# Patient Record
Sex: FEMALE | Race: WHITE | Employment: UNEMPLOYED | URBAN - METROPOLITAN AREA
[De-identification: names, ages, dates, MRNs, and addresses within clinical notes are randomized per-mention and may not be internally consistent; named-entity substitution may affect disease eponyms.]

---

## 2019-02-04 DIAGNOSIS — Z00.00 WELL ADULT EXAM: ICD-10-CM

## 2019-02-04 DIAGNOSIS — Z11.3 SCREENING FOR STDS (SEXUALLY TRANSMITTED DISEASES): ICD-10-CM

## 2019-02-04 DIAGNOSIS — N92.6 IRREGULAR MENSTRUAL BLEEDING: ICD-10-CM

## 2019-02-04 LAB
A/G RATIO: 2.1 (ref 1.1–2.2)
ALBUMIN SERPL-MCNC: 5 G/DL (ref 3.4–5)
ALP BLD-CCNC: 81 U/L (ref 40–129)
ALT SERPL-CCNC: 22 U/L (ref 10–40)
ANION GAP SERPL CALCULATED.3IONS-SCNC: 13 MMOL/L (ref 3–16)
AST SERPL-CCNC: 16 U/L (ref 15–37)
BILIRUB SERPL-MCNC: <0.2 MG/DL (ref 0–1)
BUN BLDV-MCNC: 8 MG/DL (ref 7–20)
CALCIUM SERPL-MCNC: 9.7 MG/DL (ref 8.3–10.6)
CHLORIDE BLD-SCNC: 102 MMOL/L (ref 99–110)
CHOLESTEROL, TOTAL: 179 MG/DL (ref 0–199)
CO2: 26 MMOL/L (ref 21–32)
CREAT SERPL-MCNC: 0.6 MG/DL (ref 0.6–1.1)
GFR AFRICAN AMERICAN: >60
GFR NON-AFRICAN AMERICAN: >60
GLOBULIN: 2.4 G/DL
GLUCOSE BLD-MCNC: 91 MG/DL (ref 70–99)
HBV SURFACE AB TITR SER: <3.5 MIU/ML
HCT VFR BLD CALC: 43.6 % (ref 36–48)
HDLC SERPL-MCNC: 59 MG/DL (ref 40–60)
HEMOGLOBIN: 14.9 G/DL (ref 12–16)
HEPATITIS C ANTIBODY INTERPRETATION: NORMAL
LDL CHOLESTEROL CALCULATED: 106 MG/DL
MCH RBC QN AUTO: 33 PG (ref 26–34)
MCHC RBC AUTO-ENTMCNC: 34.3 G/DL (ref 31–36)
MCV RBC AUTO: 96.2 FL (ref 80–100)
PDW BLD-RTO: 12.3 % (ref 12.4–15.4)
PLATELET # BLD: 347 K/UL (ref 135–450)
PMV BLD AUTO: 7.3 FL (ref 5–10.5)
POTASSIUM SERPL-SCNC: 4.5 MMOL/L (ref 3.5–5.1)
RBC # BLD: 4.53 M/UL (ref 4–5.2)
SODIUM BLD-SCNC: 141 MMOL/L (ref 136–145)
TOTAL PROTEIN: 7.4 G/DL (ref 6.4–8.2)
TRIGL SERPL-MCNC: 71 MG/DL (ref 0–150)
TSH REFLEX: 2.72 UIU/ML (ref 0.27–4.2)
VLDLC SERPL CALC-MCNC: 14 MG/DL
WBC # BLD: 5.3 K/UL (ref 4–11)

## 2019-02-05 LAB
HIV AG/AB: NORMAL
HIV ANTIGEN: NORMAL
HIV-1 ANTIBODY: NORMAL
HIV-2 AB: NORMAL
TOTAL SYPHILLIS IGG/IGM: NORMAL

## 2019-02-06 LAB
DHEAS (DHEA SULFATE): 366 UG/DL (ref 65–380)
INSULIN REFERENCE RANGE:: NORMAL
INSULIN: 14.3 MU/L
TESTOSTERONE TOTAL: 49 NG/DL (ref 20–70)

## 2020-04-14 ENCOUNTER — TELEPHONE (OUTPATIENT)
Dept: ORTHOPEDIC SURGERY | Age: 25
End: 2020-04-14

## 2020-12-02 PROBLEM — Z30.41 SURVEILLANCE FOR BIRTH CONTROL, ORAL CONTRACEPTIVES: Status: ACTIVE | Noted: 2020-12-02

## 2021-07-07 DIAGNOSIS — Z00.00 WELL ADULT EXAM: ICD-10-CM

## 2021-07-07 DIAGNOSIS — E28.2 PCOS (POLYCYSTIC OVARIAN SYNDROME): ICD-10-CM

## 2021-07-08 DIAGNOSIS — L68.0 HIRSUTISM: ICD-10-CM

## 2021-07-08 LAB
A/G RATIO: 1.7 (ref 1.1–2.2)
ALBUMIN SERPL-MCNC: 4.5 G/DL (ref 3.4–5)
ALP BLD-CCNC: 61 U/L (ref 40–129)
ALT SERPL-CCNC: 16 U/L (ref 10–40)
ANION GAP SERPL CALCULATED.3IONS-SCNC: 12 MMOL/L (ref 3–16)
AST SERPL-CCNC: 18 U/L (ref 15–37)
BILIRUB SERPL-MCNC: 0.3 MG/DL (ref 0–1)
BUN BLDV-MCNC: 10 MG/DL (ref 7–20)
CALCIUM SERPL-MCNC: 9.4 MG/DL (ref 8.3–10.6)
CHLORIDE BLD-SCNC: 99 MMOL/L (ref 99–110)
CHOLESTEROL, TOTAL: 170 MG/DL (ref 0–199)
CO2: 24 MMOL/L (ref 21–32)
CORTISOL - AM: 30.2 UG/DL (ref 4.3–22.4)
CREAT SERPL-MCNC: 0.6 MG/DL (ref 0.6–1.1)
GFR AFRICAN AMERICAN: >60
GFR NON-AFRICAN AMERICAN: >60
GLOBULIN: 2.6 G/DL
GLUCOSE BLD-MCNC: 91 MG/DL (ref 70–99)
HCT VFR BLD CALC: 38.3 % (ref 36–48)
HDLC SERPL-MCNC: 60 MG/DL (ref 40–60)
HEMOGLOBIN: 13.5 G/DL (ref 12–16)
LDL CHOLESTEROL CALCULATED: 81 MG/DL
MCH RBC QN AUTO: 33.3 PG (ref 26–34)
MCHC RBC AUTO-ENTMCNC: 35.3 G/DL (ref 31–36)
MCV RBC AUTO: 94.1 FL (ref 80–100)
PDW BLD-RTO: 11.5 % (ref 12.4–15.4)
PLATELET # BLD: 298 K/UL (ref 135–450)
PMV BLD AUTO: 7.4 FL (ref 5–10.5)
POTASSIUM SERPL-SCNC: 4.6 MMOL/L (ref 3.5–5.1)
RBC # BLD: 4.07 M/UL (ref 4–5.2)
SODIUM BLD-SCNC: 135 MMOL/L (ref 136–145)
TOTAL PROTEIN: 7.1 G/DL (ref 6.4–8.2)
TRIGL SERPL-MCNC: 147 MG/DL (ref 0–150)
TSH REFLEX: 2.65 UIU/ML (ref 0.27–4.2)
VLDLC SERPL CALC-MCNC: 29 MG/DL
WBC # BLD: 7 K/UL (ref 4–11)

## 2021-07-09 LAB
ESTIMATED AVERAGE GLUCOSE: 88.2 MG/DL
HBA1C MFR BLD: 4.7 %
SEX HORMONE BINDING GLOBULIN: 128 NMOL/L (ref 30–135)
TESTOSTERONE FREE-NONMALE: 3.2 PG/ML (ref 0.8–7.4)
TESTOSTERONE TOTAL: 48 NG/DL (ref 20–70)

## 2021-07-12 LAB — DHEAS (DHEA SULFATE): 190 UG/DL (ref 65–380)

## 2023-03-14 NOTE — PROGRESS NOTES
Subjective:      Patient ID: Francia Ochoa is a 32 y.o. female is here for her annual wellness exam.     HPI    PAP: negative 8/29/22. Dr. Renetta Zapata. See Care Everywhere. Menses are regular. Vaccines: had initial 2 doses. Diet:  eats well most of the time  Exercise: 44178 steps a day. Sleeps well. Labs: normal 2021. ADD/ADHD:  Current treatment: Adderall- 10 mg and Adderall XR- 25 mg, which has been effective. Residual symptoms: none. Medication side effects: None. Patient denies anorexia, palpitations, insomnia, irritability, and anxiety. PDMP reviewed and no concerns noted. Last filled Adderall 2/16/23. Health Maintenance   Topic Date Due    COVID-19 Vaccine (1) Never done    Flu vaccine (1) 08/01/2022    Pap smear  08/29/2025 (Originally 11/12/2021)    Depression Screen  10/13/2023    DTaP/Tdap/Td vaccine (3 - Td or Tdap) 03/15/2028    Hepatitis C screen  Completed    HIV screen  Completed    Hepatitis A vaccine  Aged Out    Hib vaccine  Aged Out    Meningococcal (ACWY) vaccine  Aged Out    Pneumococcal 0-64 years Vaccine  Aged Out    Varicella vaccine  Discontinued           Outpatient Medications Marked as Taking for the 3/17/23 encounter (Office Visit) with Paramjit Harding MD   Medication Sig Dispense Refill    norgestimate-ethinyl estradiol ([address] [address]) 0.25-35 MG-MCG per tablet TAKE ONE TABLET BY MOUTH DAILY 84 tablet 3    amphetamine-dextroamphetamine (ADDERALL XR) 25 MG extended release capsule Take 1 capsule by mouth every morning for 30 days. 30 capsule 0    amphetamine-dextroamphetamine (ADDERALL XR) 25 MG extended release capsule Take 1 capsule by mouth every morning for 30 days. 30 capsule 0    amphetamine-dextroamphetamine (ADDERALL XR) 25 MG extended release capsule Take 1 capsule by mouth every morning for 30 days. 30 capsule 0    amphetamine-dextroamphetamine (ADDERALL, 10MG,) 10 MG tablet Take 1-2 tablets by mouth daily for 30 days.  60 tablet 0 amphetamine-dextroamphetamine (ADDERALL, 10MG,) 10 MG tablet Take 1-2 tablets by mouth daily for 30 days. 60 tablet 0    amphetamine-dextroamphetamine (ADDERALL, 10MG,) 10 MG tablet Take 1-2 tablets by mouth daily for 30 days. 60 tablet 0    cyanocobalamin ([employer] VITAMIN B12) 1000 MCG tablet Take 1 tablet by mouth daily 30 tablet 3       Allergies   Allergen Reactions    Sulfa Antibiotics Other (See Comments)     diarrhea    Ciprocinonide [Fluocinolone] Nausea And Vomiting       Patient Active Problem List   Diagnosis    Allergic rhinitis    Attention deficit disorder    High risk medication use    Surveillance for birth control, oral contraceptives        Past Medical History:   Diagnosis Date    ETD (eustachian tube dysfunction) 8/28/2015    Increased urinary frequency     Pain with urination     PCOS (polycystic ovarian syndrome)     Sinusitis, acute maxillary        No past surgical history on file. Social History     Tobacco Use    Smoking status: Never    Smokeless tobacco: Never   Vaping Use    Vaping Use: Never used   Substance Use Topics    Alcohol use: Yes     Comment: socially    Drug use: No       No family history on file. Review of Systems  Review of Systems   Constitutional:  Negative for activity change, appetite change, fatigue and unexpected weight change. HENT:  Negative for congestion, hearing loss, nosebleeds, sore throat, tinnitus, trouble swallowing and voice change. Eyes:  Negative for visual disturbance. Respiratory:  Negative for choking, chest tightness, shortness of breath and wheezing. Cardiovascular:  Negative for chest pain, palpitations and leg swelling. Gastrointestinal:  Negative for abdominal pain, anal bleeding, blood in stool, constipation, diarrhea and nausea. Genitourinary:  Negative for dysuria, flank pain, frequency, hematuria, pelvic pain, vaginal bleeding and vaginal discharge. Musculoskeletal:  Negative for arthralgias, back pain and myalgias.    Skin: Negative for color change and rash. Neurological:  Negative for light-headedness and headaches. Hematological:  Does not bruise/bleed easily. Psychiatric/Behavioral:  Negative for dysphoric mood and sleep disturbance. The patient is not nervous/anxious.     Lab Results   Component Value Date     (L) 07/08/2021    K 4.6 07/08/2021    CL 99 07/08/2021    CO2 24 07/08/2021    BUN 10 07/08/2021    CREATININE 0.6 07/08/2021    GLUCOSE 91 07/08/2021    CALCIUM 9.4 07/08/2021    PROT 7.1 07/08/2021    LABALBU 4.5 07/08/2021    BILITOT 0.3 07/08/2021    ALKPHOS 61 07/08/2021    AST 18 07/08/2021    ALT 16 07/08/2021    LABGLOM >60 07/08/2021    GFRAA >60 07/08/2021    AGRATIO 1.7 07/08/2021    GLOB 2.6 07/08/2021        Lab Results   Component Value Date    WBC 7.0 07/08/2021    HGB 13.5 07/08/2021    HCT 38.3 07/08/2021    MCV 94.1 07/08/2021     07/08/2021     TSH   Date Value Ref Range Status   07/08/2021 2.65 0.27 - 4.20 uIU/mL Final   07/30/2020 2.70 0.27 - 4.20 uIU/mL Final   02/04/2019 2.72 0.27 - 4.20 uIU/mL Final   08/31/2012 1.39 0.7 - 5.7 uIU/ml Final     Lab Results   Component Value Date    CHOL 170 07/08/2021    CHOL 179 02/04/2019    CHOL 161 02/12/2014     Lab Results   Component Value Date    TRIG 147 07/08/2021    TRIG 71 02/04/2019    TRIG 85 02/12/2014     Lab Results   Component Value Date    HDL 60 07/08/2021    HDL 59 02/04/2019    HDL 51 02/12/2014     Lab Results   Component Value Date    LDLCALC 81 07/08/2021    LDLCALC 106 (H) 02/04/2019    LDLCALC 93 02/12/2014     Lab Results   Component Value Date    LABVLDL 29 07/08/2021    LABVLDL 14 02/04/2019    LABVLDL 17 02/12/2014     No results found for: CHOLHDLRATIO   Objective:   Physical Exam  .  Wt Readings from Last 3 Encounters:   03/17/23 169 lb 9.6 oz (76.9 kg)   07/12/21 169 lb 12.8 oz (77 kg)   12/02/20 170 lb 6.4 oz (77.3 kg)     Temp Readings from Last 3 Encounters:   03/17/23 98.3 °F (36.8 °C) (Temporal)   07/12/21 97.5 °F (36.4 °C) (Temporal)   12/02/20 97.8 °F (36.6 °C) (Temporal)     BP Readings from Last 3 Encounters:   03/17/23 128/74   07/12/21 120/82   12/02/20 116/64     Pulse Readings from Last 3 Encounters:   03/17/23 63   07/12/21 86   12/02/20 88        Physical Exam  Constitutional:       Appearance: Normal appearance. She is well-developed. HENT:      Head: Normocephalic and atraumatic. Right Ear: Hearing, tympanic membrane, ear canal and external ear normal.      Left Ear: Hearing, tympanic membrane, ear canal and external ear normal.      Nose: Nose normal.   Eyes:      General: Lids are normal.      Conjunctiva/sclera: Conjunctivae normal.   Neck:      Thyroid: No thyroid mass or thyromegaly. Trachea: Trachea normal.   Cardiovascular:      Rate and Rhythm: Normal rate and regular rhythm. Pulses: Normal pulses. Heart sounds: Normal heart sounds. No murmur heard. Pulmonary:      Effort: Pulmonary effort is normal.      Breath sounds: Normal breath sounds. Abdominal:      General: Bowel sounds are normal.      Palpations: Abdomen is soft. Tenderness: There is no abdominal tenderness. Hernia: No hernia is present. Musculoskeletal:      Cervical back: Neck supple. Lymphadenopathy:      Head:      Right side of head: No submandibular adenopathy. Left side of head: No submandibular adenopathy. Cervical: No cervical adenopathy. Skin:     General: Skin is warm and dry. Findings: No lesion or rash. Comments: No abnormal appearing lesions on exposed skin   Neurological:      Mental Status: She is alert and oriented to person, place, and time. Gait: Gait normal.      Deep Tendon Reflexes:      Reflex Scores:       Patellar reflexes are 2+ on the right side and 2+ on the left side. Psychiatric:         Speech: Speech normal.         Behavior: Behavior normal.         Judgment: Judgment normal.         Assessment and Plan       Diagnosis Orders   1.  Well adult exam  Comprehensive Metabolic Panel    TSH with Reflex    Lipid Panel    CBC    Exercise, diet  PAP per gyne  DT every 10 years  Influenza vaccine annually        2. Attention deficit disorder (ADD) without hyperactivity  amphetamine-dextroamphetamine (ADDERALL XR) 25 MG extended release capsule    amphetamine-dextroamphetamine (ADDERALL XR) 25 MG extended release capsule    amphetamine-dextroamphetamine (ADDERALL XR) 25 MG extended release capsule    amphetamine-dextroamphetamine (ADDERALL, 10MG,) 10 MG tablet    amphetamine-dextroamphetamine (ADDERALL, 10MG,) 10 MG tablet  PDMP reviewed and no concerns noted. Well controlled      3. Need for vaccination  Encouraged COVID booster. Side effects of current medications reviewed and questions answered. Follow up in 3 months or prn.

## 2023-03-17 ENCOUNTER — OFFICE VISIT (OUTPATIENT)
Dept: FAMILY MEDICINE CLINIC | Age: 28
End: 2023-03-17

## 2023-03-17 VITALS
TEMPERATURE: 98.3 F | BODY MASS INDEX: 31.21 KG/M2 | OXYGEN SATURATION: 100 % | DIASTOLIC BLOOD PRESSURE: 74 MMHG | HEIGHT: 62 IN | SYSTOLIC BLOOD PRESSURE: 128 MMHG | RESPIRATION RATE: 14 BRPM | WEIGHT: 169.6 LBS | HEART RATE: 63 BPM

## 2023-03-17 DIAGNOSIS — Z23 NEED FOR VACCINATION: ICD-10-CM

## 2023-03-17 DIAGNOSIS — Z00.00 WELL ADULT EXAM: ICD-10-CM

## 2023-03-17 DIAGNOSIS — F98.8 ATTENTION DEFICIT DISORDER (ADD) WITHOUT HYPERACTIVITY: ICD-10-CM

## 2023-03-17 DIAGNOSIS — Z00.00 WELL ADULT EXAM: Primary | ICD-10-CM

## 2023-03-17 LAB
DEPRECATED RDW RBC AUTO: 11.9 % (ref 12.4–15.4)
HCT VFR BLD AUTO: 39.8 % (ref 36–48)
HGB BLD-MCNC: 13.9 G/DL (ref 12–16)
MCH RBC QN AUTO: 32.4 PG (ref 26–34)
MCHC RBC AUTO-ENTMCNC: 34.9 G/DL (ref 31–36)
MCV RBC AUTO: 92.7 FL (ref 80–100)
PLATELET # BLD AUTO: 341 K/UL (ref 135–450)
PMV BLD AUTO: 7.2 FL (ref 5–10.5)
RBC # BLD AUTO: 4.29 M/UL (ref 4–5.2)
WBC # BLD AUTO: 5.8 K/UL (ref 4–11)

## 2023-03-17 RX ORDER — DEXTROAMPHETAMINE SACCHARATE, AMPHETAMINE ASPARTATE MONOHYDRATE, DEXTROAMPHETAMINE SULFATE AND AMPHETAMINE SULFATE 6.25; 6.25; 6.25; 6.25 MG/1; MG/1; MG/1; MG/1
25 CAPSULE, EXTENDED RELEASE ORAL EVERY MORNING
Qty: 30 CAPSULE | Refills: 0 | Status: SHIPPED | OUTPATIENT
Start: 2023-04-16 | End: 2023-05-16

## 2023-03-17 RX ORDER — DEXTROAMPHETAMINE SACCHARATE, AMPHETAMINE ASPARTATE, DEXTROAMPHETAMINE SULFATE AND AMPHETAMINE SULFATE 2.5; 2.5; 2.5; 2.5 MG/1; MG/1; MG/1; MG/1
10-20 TABLET ORAL DAILY
Qty: 60 TABLET | Refills: 0 | Status: SHIPPED | OUTPATIENT
Start: 2023-04-16 | End: 2023-05-16

## 2023-03-17 RX ORDER — DEXTROAMPHETAMINE SACCHARATE, AMPHETAMINE ASPARTATE MONOHYDRATE, DEXTROAMPHETAMINE SULFATE AND AMPHETAMINE SULFATE 6.25; 6.25; 6.25; 6.25 MG/1; MG/1; MG/1; MG/1
25 CAPSULE, EXTENDED RELEASE ORAL EVERY MORNING
Qty: 30 CAPSULE | Refills: 0 | Status: SHIPPED | OUTPATIENT
Start: 2023-05-16 | End: 2023-06-15

## 2023-03-17 RX ORDER — DEXTROAMPHETAMINE SACCHARATE, AMPHETAMINE ASPARTATE, DEXTROAMPHETAMINE SULFATE AND AMPHETAMINE SULFATE 2.5; 2.5; 2.5; 2.5 MG/1; MG/1; MG/1; MG/1
10-20 TABLET ORAL DAILY
Qty: 60 TABLET | Refills: 0 | Status: SHIPPED | OUTPATIENT
Start: 2023-05-16 | End: 2023-06-15

## 2023-03-17 RX ORDER — DEXTROAMPHETAMINE SACCHARATE, AMPHETAMINE ASPARTATE, DEXTROAMPHETAMINE SULFATE AND AMPHETAMINE SULFATE 2.5; 2.5; 2.5; 2.5 MG/1; MG/1; MG/1; MG/1
10-20 TABLET ORAL DAILY
Qty: 60 TABLET | Refills: 0 | Status: CANCELLED | OUTPATIENT
Start: 2023-03-17 | End: 2023-04-16

## 2023-03-17 RX ORDER — DEXTROAMPHETAMINE SACCHARATE, AMPHETAMINE ASPARTATE MONOHYDRATE, DEXTROAMPHETAMINE SULFATE AND AMPHETAMINE SULFATE 6.25; 6.25; 6.25; 6.25 MG/1; MG/1; MG/1; MG/1
25 CAPSULE, EXTENDED RELEASE ORAL EVERY MORNING
Qty: 30 CAPSULE | Refills: 0 | Status: SHIPPED | OUTPATIENT
Start: 2023-03-17 | End: 2023-04-16

## 2023-03-17 SDOH — ECONOMIC STABILITY: FOOD INSECURITY: WITHIN THE PAST 12 MONTHS, YOU WORRIED THAT YOUR FOOD WOULD RUN OUT BEFORE YOU GOT MONEY TO BUY MORE.: NEVER TRUE

## 2023-03-17 SDOH — ECONOMIC STABILITY: INCOME INSECURITY: HOW HARD IS IT FOR YOU TO PAY FOR THE VERY BASICS LIKE FOOD, HOUSING, MEDICAL CARE, AND HEATING?: NOT HARD AT ALL

## 2023-03-17 SDOH — ECONOMIC STABILITY: HOUSING INSECURITY
IN THE LAST 12 MONTHS, WAS THERE A TIME WHEN YOU DID NOT HAVE A STEADY PLACE TO SLEEP OR SLEPT IN A SHELTER (INCLUDING NOW)?: NO

## 2023-03-17 SDOH — ECONOMIC STABILITY: FOOD INSECURITY: WITHIN THE PAST 12 MONTHS, THE FOOD YOU BOUGHT JUST DIDN'T LAST AND YOU DIDN'T HAVE MONEY TO GET MORE.: NEVER TRUE

## 2023-03-17 ASSESSMENT — ENCOUNTER SYMPTOMS
SHORTNESS OF BREATH: 0
ABDOMINAL PAIN: 0
ANAL BLEEDING: 0
COLOR CHANGE: 0
CONSTIPATION: 0
VOICE CHANGE: 0
CHOKING: 0
BACK PAIN: 0
SORE THROAT: 0
BLOOD IN STOOL: 0
WHEEZING: 0
NAUSEA: 0
DIARRHEA: 0
CHEST TIGHTNESS: 0
TROUBLE SWALLOWING: 0

## 2023-03-17 ASSESSMENT — PATIENT HEALTH QUESTIONNAIRE - PHQ9
SUM OF ALL RESPONSES TO PHQ QUESTIONS 1-9: 0
1. LITTLE INTEREST OR PLEASURE IN DOING THINGS: 0
2. FEELING DOWN, DEPRESSED OR HOPELESS: 0
SUM OF ALL RESPONSES TO PHQ QUESTIONS 1-9: 0
SUM OF ALL RESPONSES TO PHQ9 QUESTIONS 1 & 2: 0

## 2023-03-18 LAB
ALBUMIN SERPL-MCNC: 4.6 G/DL (ref 3.4–5)
ALBUMIN/GLOB SERPL: 1.6 {RATIO} (ref 1.1–2.2)
ALP SERPL-CCNC: 65 U/L (ref 40–129)
ALT SERPL-CCNC: 17 U/L (ref 10–40)
ANION GAP SERPL CALCULATED.3IONS-SCNC: 13 MMOL/L (ref 3–16)
AST SERPL-CCNC: 17 U/L (ref 15–37)
BILIRUB SERPL-MCNC: 0.3 MG/DL (ref 0–1)
BUN SERPL-MCNC: 9 MG/DL (ref 7–20)
CALCIUM SERPL-MCNC: 9.7 MG/DL (ref 8.3–10.6)
CHLORIDE SERPL-SCNC: 100 MMOL/L (ref 99–110)
CHOLEST SERPL-MCNC: 175 MG/DL (ref 0–199)
CO2 SERPL-SCNC: 24 MMOL/L (ref 21–32)
CREAT SERPL-MCNC: 0.6 MG/DL (ref 0.6–1.1)
GFR SERPLBLD CREATININE-BSD FMLA CKD-EPI: >60 ML/MIN/{1.73_M2}
GLUCOSE SERPL-MCNC: 84 MG/DL (ref 70–99)
HDLC SERPL-MCNC: 52 MG/DL (ref 40–60)
LDLC SERPL CALC-MCNC: 97 MG/DL
POTASSIUM SERPL-SCNC: 4 MMOL/L (ref 3.5–5.1)
PROT SERPL-MCNC: 7.4 G/DL (ref 6.4–8.2)
SODIUM SERPL-SCNC: 137 MMOL/L (ref 136–145)
TRIGL SERPL-MCNC: 129 MG/DL (ref 0–150)
TSH SERPL DL<=0.005 MIU/L-ACNC: 1.94 UIU/ML (ref 0.27–4.2)
VLDLC SERPL CALC-MCNC: 26 MG/DL

## 2023-04-29 ENCOUNTER — E-VISIT (OUTPATIENT)
Dept: PRIMARY CARE CLINIC | Age: 28
End: 2023-04-29
Payer: COMMERCIAL

## 2023-04-29 DIAGNOSIS — J06.9 UPPER RESPIRATORY TRACT INFECTION, UNSPECIFIED TYPE: Primary | ICD-10-CM

## 2023-04-29 PROCEDURE — 99422 OL DIG E/M SVC 11-20 MIN: CPT | Performed by: NURSE PRACTITIONER

## 2023-04-29 RX ORDER — AZITHROMYCIN 250 MG/1
TABLET, FILM COATED ORAL
Qty: 1 PACKET | Refills: 0 | Status: SHIPPED | OUTPATIENT
Start: 2023-04-29

## 2023-04-29 ASSESSMENT — LIFESTYLE VARIABLES: SMOKING_STATUS: NO, I'VE NEVER SMOKED

## 2023-04-29 NOTE — PROGRESS NOTES
Reviewed questionnaire     Reviewed meds/allergies    Dx URI    Plan Rest/fluids. Tylenol, motrin. Patient concerned for strep.  Rx given for zpack if needed for symptoms that change, worsen or last longer than 7 days  Follow up with PCP if no improvement    Time spent on visit 11 min

## 2024-02-08 ENCOUNTER — TELEPHONE (OUTPATIENT)
Dept: PRIMARY CARE CLINIC | Age: 29
End: 2024-02-08

## 2024-02-08 ENCOUNTER — OFFICE VISIT (OUTPATIENT)
Dept: PRIMARY CARE CLINIC | Age: 29
End: 2024-02-08
Payer: COMMERCIAL

## 2024-02-08 VITALS
RESPIRATION RATE: 16 BRPM | SYSTOLIC BLOOD PRESSURE: 130 MMHG | BODY MASS INDEX: 32.02 KG/M2 | TEMPERATURE: 97.3 F | HEART RATE: 70 BPM | WEIGHT: 174 LBS | OXYGEN SATURATION: 99 % | DIASTOLIC BLOOD PRESSURE: 80 MMHG | HEIGHT: 62 IN

## 2024-02-08 DIAGNOSIS — F98.8 ATTENTION DEFICIT DISORDER (ADD) WITHOUT HYPERACTIVITY: ICD-10-CM

## 2024-02-08 DIAGNOSIS — E28.2 PCOS (POLYCYSTIC OVARIAN SYNDROME): ICD-10-CM

## 2024-02-08 DIAGNOSIS — Z13.220 ENCOUNTER FOR SCREENING FOR LIPOID DISORDERS: ICD-10-CM

## 2024-02-08 DIAGNOSIS — M79.89 SOFT TISSUE MASS: ICD-10-CM

## 2024-02-08 DIAGNOSIS — L24.9 IRRITANT CONTACT DERMATITIS, UNSPECIFIED TRIGGER: Primary | ICD-10-CM

## 2024-02-08 DIAGNOSIS — F41.9 ANXIETY: ICD-10-CM

## 2024-02-08 DIAGNOSIS — Z13.1 SCREENING FOR DIABETES MELLITUS: ICD-10-CM

## 2024-02-08 DIAGNOSIS — L24.9 IRRITANT CONTACT DERMATITIS, UNSPECIFIED TRIGGER: ICD-10-CM

## 2024-02-08 DIAGNOSIS — R03.0 WHITE COAT SYNDROME WITHOUT DIAGNOSIS OF HYPERTENSION: ICD-10-CM

## 2024-02-08 DIAGNOSIS — M79.89 SOFT TISSUE MASS: Primary | ICD-10-CM

## 2024-02-08 LAB
ALBUMIN SERPL-MCNC: 4.8 G/DL (ref 3.4–5)
ALBUMIN/GLOB SERPL: 2.2 {RATIO} (ref 1.1–2.2)
ALP SERPL-CCNC: 77 U/L (ref 40–129)
ALT SERPL-CCNC: 22 U/L (ref 10–40)
ANION GAP SERPL CALCULATED.3IONS-SCNC: 12 MMOL/L (ref 3–16)
AST SERPL-CCNC: 18 U/L (ref 15–37)
BASOPHILS # BLD: 0 K/UL (ref 0–0.2)
BASOPHILS NFR BLD: 0.6 %
BILIRUB SERPL-MCNC: 0.4 MG/DL (ref 0–1)
BUN SERPL-MCNC: 10 MG/DL (ref 7–20)
CALCIUM SERPL-MCNC: 9.9 MG/DL (ref 8.3–10.6)
CHLORIDE SERPL-SCNC: 100 MMOL/L (ref 99–110)
CHOLEST SERPL-MCNC: 196 MG/DL (ref 0–199)
CO2 SERPL-SCNC: 25 MMOL/L (ref 21–32)
CREAT SERPL-MCNC: 0.5 MG/DL (ref 0.6–1.1)
DEPRECATED RDW RBC AUTO: 12.2 % (ref 12.4–15.4)
EOSINOPHIL # BLD: 0.1 K/UL (ref 0–0.6)
EOSINOPHIL NFR BLD: 1.8 %
GFR SERPLBLD CREATININE-BSD FMLA CKD-EPI: >60 ML/MIN/{1.73_M2}
GLUCOSE P FAST SERPL-MCNC: 92 MG/DL (ref 70–99)
HCT VFR BLD AUTO: 40.1 % (ref 36–48)
HDLC SERPL-MCNC: 51 MG/DL (ref 40–60)
HGB BLD-MCNC: 14 G/DL (ref 12–16)
LDLC SERPL CALC-MCNC: 122 MG/DL
LYMPHOCYTES # BLD: 1.9 K/UL (ref 1–5.1)
LYMPHOCYTES NFR BLD: 36 %
MCH RBC QN AUTO: 32.3 PG (ref 26–34)
MCHC RBC AUTO-ENTMCNC: 35 G/DL (ref 31–36)
MCV RBC AUTO: 92.3 FL (ref 80–100)
MONOCYTES # BLD: 0.4 K/UL (ref 0–1.3)
MONOCYTES NFR BLD: 6.9 %
NEUTROPHILS # BLD: 2.9 K/UL (ref 1.7–7.7)
NEUTROPHILS NFR BLD: 54.7 %
PLATELET # BLD AUTO: 310 K/UL (ref 135–450)
PMV BLD AUTO: 7.6 FL (ref 5–10.5)
POTASSIUM SERPL-SCNC: 3.9 MMOL/L (ref 3.5–5.1)
PROT SERPL-MCNC: 7 G/DL (ref 6.4–8.2)
RBC # BLD AUTO: 4.34 M/UL (ref 4–5.2)
SODIUM SERPL-SCNC: 137 MMOL/L (ref 136–145)
TRIGL SERPL-MCNC: 117 MG/DL (ref 0–150)
TSH SERPL DL<=0.005 MIU/L-ACNC: 1.78 UIU/ML (ref 0.27–4.2)
VLDLC SERPL CALC-MCNC: 23 MG/DL
WBC # BLD AUTO: 5.4 K/UL (ref 4–11)

## 2024-02-08 PROCEDURE — G8427 DOCREV CUR MEDS BY ELIG CLIN: HCPCS | Performed by: STUDENT IN AN ORGANIZED HEALTH CARE EDUCATION/TRAINING PROGRAM

## 2024-02-08 PROCEDURE — 1036F TOBACCO NON-USER: CPT | Performed by: STUDENT IN AN ORGANIZED HEALTH CARE EDUCATION/TRAINING PROGRAM

## 2024-02-08 PROCEDURE — 99204 OFFICE O/P NEW MOD 45 MIN: CPT | Performed by: STUDENT IN AN ORGANIZED HEALTH CARE EDUCATION/TRAINING PROGRAM

## 2024-02-08 PROCEDURE — G8417 CALC BMI ABV UP PARAM F/U: HCPCS | Performed by: STUDENT IN AN ORGANIZED HEALTH CARE EDUCATION/TRAINING PROGRAM

## 2024-02-08 PROCEDURE — G8484 FLU IMMUNIZE NO ADMIN: HCPCS | Performed by: STUDENT IN AN ORGANIZED HEALTH CARE EDUCATION/TRAINING PROGRAM

## 2024-02-08 RX ORDER — TRIAMCINOLONE ACETONIDE 1 MG/G
OINTMENT TOPICAL
Qty: 30 G | Refills: 0 | Status: SHIPPED | OUTPATIENT
Start: 2024-02-08 | End: 2024-02-15

## 2024-02-08 ASSESSMENT — ANXIETY QUESTIONNAIRES
1. FEELING NERVOUS, ANXIOUS, OR ON EDGE: 3
5. BEING SO RESTLESS THAT IT IS HARD TO SIT STILL: 0
GAD7 TOTAL SCORE: 9
7. FEELING AFRAID AS IF SOMETHING AWFUL MIGHT HAPPEN: 0
4. TROUBLE RELAXING: 1
2. NOT BEING ABLE TO STOP OR CONTROL WORRYING: 2
6. BECOMING EASILY ANNOYED OR IRRITABLE: 1
3. WORRYING TOO MUCH ABOUT DIFFERENT THINGS: 2
IF YOU CHECKED OFF ANY PROBLEMS ON THIS QUESTIONNAIRE, HOW DIFFICULT HAVE THESE PROBLEMS MADE IT FOR YOU TO DO YOUR WORK, TAKE CARE OF THINGS AT HOME, OR GET ALONG WITH OTHER PEOPLE: NOT DIFFICULT AT ALL

## 2024-02-08 ASSESSMENT — PATIENT HEALTH QUESTIONNAIRE - PHQ9
1. LITTLE INTEREST OR PLEASURE IN DOING THINGS: 0
SUM OF ALL RESPONSES TO PHQ QUESTIONS 1-9: 0
SUM OF ALL RESPONSES TO PHQ9 QUESTIONS 1 & 2: 0
2. FEELING DOWN, DEPRESSED OR HOPELESS: 0

## 2024-02-08 NOTE — PATIENT INSTRUCTIONS
[email or web address]  ?   [facility]  ? Accepts most insurances, including Medicaid/Medicare  ? [address] [place] [address]  ? Phone: [phone]  ? [email or web address]  ?   [facility]  ? Likely will be out-of-network provider  ? Costs range from $100-$350  ? For more information, email [email or web address]  ? [address] [place] [address]  ? Phone: [phone]  ? [email or web address]  ?   [facility]  ? Accepts most insurances, including Medicaid/Medicare  ? Three Locations  ? [place]  ?[address] [place] [address]  ? [place]  ?[address] [place] [address]  ?[address] [place] [address]  ? Phone: [phone]  ? [email or web address]  ? [facility]  ? Accepts many insurances  ? [address] [place] [address]  ? Phone: [phone]  ? [email or web address]  ?   [facility]  ? Accepts Medicare and Self-Pay  ? [address] [place] [address]  ? Phone: [phone]  ? [email or web address]  ?   [facility]  ? Many different specialties within the office  ? Does not bill insurance, but can provide you with a bill that you may  submit to try to obtain reimbursement  ? [address] [place] [address]  ? Phone: [phone]  ? [email or web address]  ?

## 2024-02-08 NOTE — PROGRESS NOTES
[facility]  [address]  [address]  [facility] [address]  Dept: [phone]  Dept Fax: [phone]  Loc: [phone]      Yudith Bedoya is a 28 y.o. female who presents today for:  Chief Complaint   Patient presents with    Establish Care    Rash         HPI:   Yudith Bedoya is 28 y.o.  who presents today for establishing care.     Yudith Bedoya is:  Personal history: ,  at [employer], [employer]     Medical history of:   ADD - stopped adderall   PCOS - was on OCP in the past  Rash in axilla x 1 month, itchy, improved with fluocinide but then recurred after about 4 days of stopping steroid cream. Right axillary mass - had hard marble x 6 months, enlarging and softening of area.   Anxiety - chronic, controlled. Has contemplated therapy.         2/8/2024     1:02 PM   CLARITZA 7 SCORE   CLARITZA-7 Total Score 9     Interpretation of CLARTIZA-7 score: 5-9 = mild anxiety, 10-14 = moderate anxiety, 15+ = severe anxiety. Recommend referral to behavioral health for scores 10 or greater.  Specialists:   OBGYN - Dr. Corry Monzon, [facility]      Objective:     Vitals:    02/08/24 1237 02/08/24 1249 02/08/24 1400   BP: (!) 160/100 (!) 144/92 130/80   Site: Right Upper Arm     Position: Sitting     Cuff Size: Large Adult     Pulse: 70     Resp: 16     Temp: 97.3 °F (36.3 °C)     SpO2: 99%     Weight: 78.9 kg (174 lb)     Height: 1.575 m (5' 2\")         Wt Readings from Last 3 Encounters:   02/08/24 78.9 kg (174 lb)   03/17/23 76.9 kg (169 lb 9.6 oz)   07/12/21 77 kg (169 lb 12.8 oz)       BP Readings from Last 3 Encounters:   02/08/24 130/80   03/17/23 128/74   07/12/21 120/82       Lab Results   Component Value Date    WBC 5.8 03/17/2023    HGB 13.9 03/17/2023    HCT 39.8 03/17/2023    MCV 92.7 03/17/2023     03/17/2023     Lab Results   Component Value Date     03/17/2023    K 4.0 03/17/2023     03/17/2023    CO2 24

## 2024-02-08 NOTE — TELEPHONE ENCOUNTER
Order Requested-*Diagnostic Bilateral Mammogram*  [facility] Scheduling - [place] calling  # [phone]    Per Scheduling Dept -   Patient Needs Order for  *Diagnostic Bilateral Mammogram*    US Soft Tissue is scheduled for tomorrow  *Please follow up with Scheduling - today  # [phone]

## 2024-02-09 LAB
EST. AVERAGE GLUCOSE BLD GHB EST-MCNC: 93.9 MG/DL
HBA1C MFR BLD: 4.9 %

## 2024-02-29 ENCOUNTER — HOSPITAL ENCOUNTER (OUTPATIENT)
Dept: WOMENS IMAGING | Age: 29
Discharge: HOME | End: 2024-02-29
Payer: COMMERCIAL

## 2024-02-29 ENCOUNTER — HOSPITAL ENCOUNTER (OUTPATIENT)
Dept: ULTRASOUND IMAGING | Age: 29
Discharge: HOME | End: 2024-02-29
Payer: COMMERCIAL

## 2024-02-29 DIAGNOSIS — M79.89 SOFT TISSUE MASS: ICD-10-CM

## 2024-02-29 PROCEDURE — 76999 ECHO EXAMINATION PROCEDURE: CPT

## 2025-02-18 NOTE — PROGRESS NOTES
2025    Yudith Bedoya (: 1995) is a 29 y.o. female, here for a preventive medicine evaluation.    Subjective   Patient Active Problem List   Diagnosis    Allergic rhinitis    Attention deficit disorder    PCOS (polycystic ovarian syndrome)    White coat syndrome without diagnosis of hypertension    Soft tissue mass    Irritant contact dermatitis    Anxiety     ADD  -No longer on Adderall    PCOS  -Follows with gyn    Anxiety  -Symptoms ***  -Therapists/counseling: ***  -Previous psychiatrists: ***  -Previous psych hospitalizations: ***  -Prior med trials: ***  -Denies SI, HI, AVH ***    Review of Systems   Constitutional:  Negative for chills, fatigue and fever.   Respiratory:  Negative for shortness of breath.    Cardiovascular:  Negative for chest pain and palpitations.   Gastrointestinal:  Negative for constipation, diarrhea, nausea and vomiting.   Neurological:  Negative for dizziness, light-headedness and headaches.   Psychiatric/Behavioral:  Negative for dysphoric mood.      Prior to Visit Medications    Not on File      Allergies   Allergen Reactions    Sulfa Antibiotics Other (See Comments)     diarrhea    Ciprocinonide [Fluocinolone] Nausea And Vomiting     Past Medical History:   Diagnosis Date    ETD (eustachian tube dysfunction) 2015    Increased urinary frequency     Pain with urination     PCOS (polycystic ovarian syndrome)     Sinusitis, acute maxillary      No past surgical history on file.    Social History     Socioeconomic History    Marital status:      Spouse name: Not on file    Number of children: Not on file    Years of education: Not on file    Highest education level: Not on file   Occupational History    Not on file   Tobacco Use    Smoking status: Never    Smokeless tobacco: Never   Vaping Use    Vaping status: Never Used   Substance and Sexual Activity    Alcohol use: Yes     Comment: socially    Drug use: No    Sexual activity: Yes     Partners:

## 2025-02-22 ASSESSMENT — PATIENT HEALTH QUESTIONNAIRE - PHQ9
SUM OF ALL RESPONSES TO PHQ9 QUESTIONS 1 & 2: 0
2. FEELING DOWN, DEPRESSED OR HOPELESS: NOT AT ALL
SUM OF ALL RESPONSES TO PHQ QUESTIONS 1-9: 0
1. LITTLE INTEREST OR PLEASURE IN DOING THINGS: NOT AT ALL
1. LITTLE INTEREST OR PLEASURE IN DOING THINGS: NOT AT ALL
SUM OF ALL RESPONSES TO PHQ QUESTIONS 1-9: 0
2. FEELING DOWN, DEPRESSED OR HOPELESS: NOT AT ALL
SUM OF ALL RESPONSES TO PHQ9 QUESTIONS 1 & 2: 0
SUM OF ALL RESPONSES TO PHQ QUESTIONS 1-9: 0
SUM OF ALL RESPONSES TO PHQ QUESTIONS 1-9: 0

## 2025-02-25 ENCOUNTER — OFFICE VISIT (OUTPATIENT)
Dept: PRIMARY CARE CLINIC | Age: 30
End: 2025-02-25

## 2025-02-25 VITALS
HEIGHT: 62 IN | BODY MASS INDEX: 33.64 KG/M2 | OXYGEN SATURATION: 98 % | DIASTOLIC BLOOD PRESSURE: 80 MMHG | HEART RATE: 89 BPM | SYSTOLIC BLOOD PRESSURE: 132 MMHG | WEIGHT: 182.8 LBS

## 2025-02-25 DIAGNOSIS — Z00.00 ENCOUNTER FOR WELLNESS EXAMINATION IN ADULT: Primary | ICD-10-CM

## 2025-02-25 DIAGNOSIS — R22.31 AXILLARY MASS, RIGHT: ICD-10-CM

## 2025-02-25 DIAGNOSIS — F41.9 ANXIETY: ICD-10-CM

## 2025-02-25 DIAGNOSIS — E28.2 PCOS (POLYCYSTIC OVARIAN SYNDROME): ICD-10-CM

## 2025-02-25 DIAGNOSIS — Z3A.08 8 WEEKS GESTATION OF PREGNANCY: ICD-10-CM

## 2025-02-25 PROBLEM — Z34.90 PREGNANT: Status: ACTIVE | Noted: 2025-02-25

## 2025-02-25 RX ORDER — PNV NO.95/FERROUS FUM/FOLIC AC 28MG-0.8MG
TABLET ORAL
COMMUNITY

## 2025-02-25 RX ORDER — LANOLIN ALCOHOL/MO/W.PET/CERES
50 CREAM (GRAM) TOPICAL DAILY PRN
Qty: 30 TABLET | Refills: 3 | Status: CANCELLED | OUTPATIENT
Start: 2025-02-25

## 2025-02-25 RX ORDER — LECITHIN 1000 MG
TABLET,CHEWABLE ORAL
COMMUNITY

## 2025-02-25 SDOH — ECONOMIC STABILITY: FOOD INSECURITY: WITHIN THE PAST 12 MONTHS, THE FOOD YOU BOUGHT JUST DIDN'T LAST AND YOU DIDN'T HAVE MONEY TO GET MORE.: NEVER TRUE

## 2025-02-25 SDOH — ECONOMIC STABILITY: FOOD INSECURITY: WITHIN THE PAST 12 MONTHS, YOU WORRIED THAT YOUR FOOD WOULD RUN OUT BEFORE YOU GOT MONEY TO BUY MORE.: NEVER TRUE

## 2025-02-25 NOTE — ASSESSMENT & PLAN NOTE
Chronic, at goal (stable), continue current treatment plan and lifestyle modifications recommended  Not requiring further medication management at this time  Strongly encourage close follow-up after delivery of baby for any acute changes

## 2025-02-25 NOTE — PROGRESS NOTES
PROGRESS NOTE   [facility]                                  [address], [address], [place] [address]         Phone: [phone]    Date of Service:  2/25/2025     Patient ID: Yudith Bedoya is a 29 y.o. female      Subjective:     CC: Annual Exam      HPI  Patient is a 29 y.o. year old female that has Allergic rhinitis; Attention deficit disorder; PCOS (polycystic ovarian syndrome); White coat syndrome without diagnosis of hypertension; Soft tissue mass; Irritant contact dermatitis; Anxiety; and Pregnant on their problem list..  Patient is presenting for above mentioned concerns.    Patient is presenting today for annual physical exam.  She notes that recently she was seen by her OB/GYN and was diagnosed as being pregnant.  She does have a follow-up visit at approximately the 10-week pregnancy carlitos.  She states that she is currently 8 weeks and.  She is currently taking a prenatal supplement to help ensure a healthy pregnancy.  Current due date projected to be October 6.    She does state that the concerns about her rash under her armpits secondary to previous deodorant use has resolved, however she notes that the lump in her armpit is persistent.  It is predominant in her right axilla, it has grown since last visit and is very tender to palpate.  Some days she notes it is more inflamed.  She has tried changing to aluminum free deodorant to help with minimal improvement.  Previous ultrasound was unremarkable for any issues approximately 1 year ago.    She denies any additional concerns    ROS:  A comprehensive review of systems was negative except for what was noted in the HPI.    Vitals:    02/25/25 0943   BP: 132/80   Site: Left Upper Arm   Position: Sitting   Cuff Size: Large Adult   Pulse: 89   SpO2: 98%   Weight: 82.9 kg (182 lb 12.8 oz)   Height: 1.575 m (5' 2.01\")       Allergies:  Sulfa antibiotics and Ciprocinonide

## 2025-02-26 ENCOUNTER — HOSPITAL ENCOUNTER (OUTPATIENT)
Dept: ULTRASOUND IMAGING | Age: 30
Discharge: HOME | End: 2025-02-26
Attending: STUDENT IN AN ORGANIZED HEALTH CARE EDUCATION/TRAINING PROGRAM
Payer: COMMERCIAL

## 2025-02-26 DIAGNOSIS — R22.31 AXILLARY MASS, RIGHT: ICD-10-CM

## 2025-02-26 LAB
ALBUMIN SERPL-MCNC: 4.7 G/DL (ref 3.4–5)
ALBUMIN/GLOB SERPL: 1.9 {RATIO} (ref 1.1–2.2)
ALP SERPL-CCNC: 66 U/L (ref 40–129)
ALT SERPL-CCNC: 28 U/L (ref 10–40)
ANION GAP SERPL CALCULATED.3IONS-SCNC: 12 MMOL/L (ref 3–16)
AST SERPL-CCNC: 21 U/L (ref 15–37)
BASOPHILS # BLD: 0 K/UL (ref 0–0.2)
BASOPHILS NFR BLD: 0.6 %
BILIRUB SERPL-MCNC: <0.2 MG/DL (ref 0–1)
BUN SERPL-MCNC: 7 MG/DL (ref 7–20)
CALCIUM SERPL-MCNC: 10 MG/DL (ref 8.3–10.6)
CHLORIDE SERPL-SCNC: 102 MMOL/L (ref 99–110)
CO2 SERPL-SCNC: 23 MMOL/L (ref 21–32)
CREAT SERPL-MCNC: 0.5 MG/DL (ref 0.6–1.1)
CRP SERPL-MCNC: 3.4 MG/L (ref 0–5.1)
DEPRECATED RDW RBC AUTO: 12.8 % (ref 12.4–15.4)
EOSINOPHIL # BLD: 0.1 K/UL (ref 0–0.6)
EOSINOPHIL NFR BLD: 1.2 %
GFR SERPLBLD CREATININE-BSD FMLA CKD-EPI: >90 ML/MIN/{1.73_M2}
GLUCOSE SERPL-MCNC: 94 MG/DL (ref 70–99)
HCT VFR BLD AUTO: 41.8 % (ref 36–48)
HGB BLD-MCNC: 14.6 G/DL (ref 12–16)
LDH SERPL L TO P-CCNC: 137 U/L (ref 100–190)
LYMPHOCYTES # BLD: 1.6 K/UL (ref 1–5.1)
LYMPHOCYTES NFR BLD: 27.5 %
MCH RBC QN AUTO: 33.3 PG (ref 26–34)
MCHC RBC AUTO-ENTMCNC: 34.8 G/DL (ref 31–36)
MCV RBC AUTO: 95.7 FL (ref 80–100)
MONOCYTES # BLD: 0.4 K/UL (ref 0–1.3)
MONOCYTES NFR BLD: 6.5 %
NEUTROPHILS # BLD: 3.8 K/UL (ref 1.7–7.7)
NEUTROPHILS NFR BLD: 64.2 %
PLATELET # BLD AUTO: 323 K/UL (ref 135–450)
PMV BLD AUTO: 7.3 FL (ref 5–10.5)
POTASSIUM SERPL-SCNC: 4 MMOL/L (ref 3.5–5.1)
PROT SERPL-MCNC: 7.2 G/DL (ref 6.4–8.2)
RBC # BLD AUTO: 4.37 M/UL (ref 4–5.2)
SODIUM SERPL-SCNC: 137 MMOL/L (ref 136–145)
WBC # BLD AUTO: 5.9 K/UL (ref 4–11)

## 2025-02-26 PROCEDURE — 76999 ECHO EXAMINATION PROCEDURE: CPT

## 2025-03-03 DIAGNOSIS — R22.31 AXILLARY MASS, RIGHT: Primary | ICD-10-CM

## 2025-03-04 ENCOUNTER — PATIENT MESSAGE (OUTPATIENT)
Dept: PRIMARY CARE CLINIC | Age: 30
End: 2025-03-04

## 2025-03-05 DIAGNOSIS — R22.31 MASS OF RIGHT AXILLA: Primary | ICD-10-CM

## 2025-03-05 NOTE — PROGRESS NOTES
Spoke with radiology dept at [facility] and they are going to talk to radiologist about MRI in first trimester. They spoke with Radiologist and got the okay to schedule MRI and will call the patient to schedule.

## 2025-03-05 NOTE — TELEPHONE ENCOUNTER
Please see other encounter - spoke to radiology dept. MRI safe in pregnancy. They will call her to schedule.

## 2025-03-05 NOTE — TELEPHONE ENCOUNTER
Calvin Zurita - I am helping to cover for Dr. Medeiros at the moment. MRI's are usually considered safe in pregnancy as they do not contain radiation (unlike X-rays and CT scans). This is why it is a preferred imaging choice in pregnancy along with ultrasound. I am not sure why they turned you away and I have forwarded your message to Dr. Medeiros so that maybe she can call the imaging center and discuss that. Once she hears back from them, I'm sure she will give you a call. Generally, MRI's are considered safe in pregnancy, just like ultrasound. Thank you

## 2025-03-05 NOTE — TELEPHONE ENCOUNTER
Called and left  for her to call me back so I can find out where she was scheduled to have MRI done. MRI is safe in pregnancy.

## 2025-03-10 ENCOUNTER — HOSPITAL ENCOUNTER (OUTPATIENT)
Dept: MRI IMAGING | Age: 30
Discharge: HOME | End: 2025-03-10
Payer: COMMERCIAL

## 2025-03-10 DIAGNOSIS — R22.31 MASS OF RIGHT AXILLA: ICD-10-CM

## 2025-03-10 PROCEDURE — 73221 MRI JOINT UPR EXTREM W/O DYE: CPT

## 2025-03-11 ENCOUNTER — RESULTS FOLLOW-UP (OUTPATIENT)
Dept: MRI IMAGING | Age: 30
End: 2025-03-11

## 2025-03-11 DIAGNOSIS — L08.9 SOFT TISSUE INFECTION: Primary | ICD-10-CM

## 2025-03-11 RX ORDER — CEPHALEXIN 500 MG/1
500 CAPSULE ORAL 2 TIMES DAILY
Qty: 20 CAPSULE | Refills: 0 | Status: SHIPPED | OUTPATIENT
Start: 2025-03-11 | End: 2025-03-21

## 2025-03-19 DIAGNOSIS — R22.31 MASS OF RIGHT AXILLA: Primary | ICD-10-CM

## 2025-04-22 ENCOUNTER — OFFICE VISIT (OUTPATIENT)
Age: 30
End: 2025-04-22
Payer: COMMERCIAL

## 2025-04-22 VITALS
BODY MASS INDEX: 31.89 KG/M2 | TEMPERATURE: 97.6 F | HEART RATE: 101 BPM | SYSTOLIC BLOOD PRESSURE: 141 MMHG | OXYGEN SATURATION: 97 % | HEIGHT: 63 IN | DIASTOLIC BLOOD PRESSURE: 92 MMHG | WEIGHT: 180 LBS

## 2025-04-22 DIAGNOSIS — R22.31 MASS OF RIGHT AXILLA: Primary | ICD-10-CM

## 2025-04-22 PROCEDURE — G8427 DOCREV CUR MEDS BY ELIG CLIN: HCPCS | Performed by: SURGERY

## 2025-04-22 PROCEDURE — G8419 CALC BMI OUT NRM PARAM NOF/U: HCPCS | Performed by: SURGERY

## 2025-04-22 PROCEDURE — 99203 OFFICE O/P NEW LOW 30 MIN: CPT | Performed by: SURGERY

## 2025-04-22 PROCEDURE — 1036F TOBACCO NON-USER: CPT | Performed by: SURGERY

## 2025-04-22 NOTE — PROGRESS NOTES
Subjective   Patient ID: Yudith Bedoya is a 29 y.o. female.    HPI  Chief Complaint   Patient presents with    New Patient     Lump in rt arm pit.  Did get MRI      Patient referred by Dr. Medeiros for evaluation of axillary mass. Patient reports symptoms of pain, discomfort. Location of symptoms is right axilla. Symptoms were first noted about 18 months ago.   Alleviated by rest.  Symptoms aggravated by pinching her arm at the side when she sleeps.  Previous evaluation includes normal ultrasound, MRI showing possible cellulitis.  She was treated with Keflex without improvement. Patient has a history of being 16 weeks pregnant with delivery in October. Will plan following treatment: Excision of right axillary lipoma after delivery.        Past Medical History:   Diagnosis Date    ADHD (attention deficit hyperactivity disorder)     ETD (eustachian tube dysfunction) 08/28/2015    Increased urinary frequency     Pain with urination     PCOS (polycystic ovarian syndrome)     Sinusitis, acute maxillary        History reviewed. No pertinent surgical history.    Current Outpatient Medications   Medication Sig Dispense Refill    Prenatal Vit-Fe Fumarate-FA (PRENATAL VITAMIN) 27-0.8 MG TABS Take by mouth      Inositol 650 MG TABS Take by mouth       No current facility-administered medications for this visit.       Prior to Admission medications    Medication Sig Start Date End Date Taking? Authorizing Provider   Prenatal Vit-Fe Fumarate-FA (PRENATAL VITAMIN) 27-0.8 MG TABS Take by mouth   Yes Carol Arellano MD   Inositol 650 MG TABS Take by mouth    ProviderCarol MD         Allergies   Allergen Reactions    Sulfa Antibiotics Other (See Comments) and Diarrhea     diarrhea    Amoxicillin     Ciprocinonide [Fluocinolone] Nausea And Vomiting       Social History     Socioeconomic History    Marital status:      Spouse name: Not on file    Number of children: Not on file    Years of education: Not